# Patient Record
Sex: MALE | Race: WHITE | NOT HISPANIC OR LATINO | Employment: OTHER | URBAN - METROPOLITAN AREA
[De-identification: names, ages, dates, MRNs, and addresses within clinical notes are randomized per-mention and may not be internally consistent; named-entity substitution may affect disease eponyms.]

---

## 2017-03-09 PROBLEM — K05.30 ADULT PERIODONTITIS: Chronic | Status: ACTIVE | Noted: 2017-03-09

## 2017-03-16 ENCOUNTER — HOSPITAL ENCOUNTER (OUTPATIENT)
Dept: RADIOLOGY | Facility: HOSPITAL | Age: 42
Setting detail: OUTPATIENT SURGERY
Discharge: HOME | End: 2017-03-16
Payer: MEDICAID

## 2017-03-16 ENCOUNTER — ANESTHESIA EVENT (OUTPATIENT)
Dept: PERIOP | Facility: HOSPITAL | Age: 42
End: 2017-03-16
Payer: MEDICAID

## 2017-03-16 ENCOUNTER — HOSPITAL ENCOUNTER (OUTPATIENT)
Facility: HOSPITAL | Age: 42
Setting detail: OUTPATIENT SURGERY
Discharge: NURSING, REHAB OR LONG-TERM CARE | End: 2017-03-16
Attending: DENTIST | Admitting: DENTIST
Payer: MEDICAID

## 2017-03-16 ENCOUNTER — ANESTHESIA (OUTPATIENT)
Dept: PERIOP | Facility: HOSPITAL | Age: 42
End: 2017-03-16
Payer: MEDICAID

## 2017-03-16 VITALS
BODY MASS INDEX: 18.03 KG/M2 | TEMPERATURE: 98.4 F | HEART RATE: 63 BPM | SYSTOLIC BLOOD PRESSURE: 119 MMHG | WEIGHT: 98 LBS | RESPIRATION RATE: 18 BRPM | DIASTOLIC BLOOD PRESSURE: 76 MMHG | HEIGHT: 62 IN | OXYGEN SATURATION: 100 %

## 2017-03-16 PROCEDURE — 93005 ELECTROCARDIOGRAM TRACING: CPT | Performed by: DENTIST

## 2017-03-16 RX ORDER — MAGNESIUM HYDROXIDE 1200 MG/15ML
LIQUID ORAL AS NEEDED
Status: DISCONTINUED | OUTPATIENT
Start: 2017-03-16 | End: 2017-03-16 | Stop reason: HOSPADM

## 2017-03-16 RX ORDER — PROPOFOL 10 MG/ML
INJECTION, EMULSION INTRAVENOUS AS NEEDED
Status: DISCONTINUED | OUTPATIENT
Start: 2017-03-16 | End: 2017-03-16 | Stop reason: SURG

## 2017-03-16 RX ORDER — CLINDAMYCIN PHOSPHATE 600 MG/50ML
600 INJECTION INTRAVENOUS
Status: DISCONTINUED | OUTPATIENT
Start: 2017-03-16 | End: 2017-03-16 | Stop reason: HOSPADM

## 2017-03-16 RX ORDER — KETAMINE HYDROCHLORIDE 50 MG/ML
INJECTION, SOLUTION, CONCENTRATE INTRAMUSCULAR; INTRAVENOUS AS NEEDED
Status: DISCONTINUED | OUTPATIENT
Start: 2017-03-16 | End: 2017-03-16 | Stop reason: SURG

## 2017-03-16 RX ORDER — BUPIVACAINE HYDROCHLORIDE AND EPINEPHRINE 5; 5 MG/ML; UG/ML
INJECTION, SOLUTION PERINEURAL AS NEEDED
Status: DISCONTINUED | OUTPATIENT
Start: 2017-03-16 | End: 2017-03-16 | Stop reason: HOSPADM

## 2017-03-16 RX ORDER — MIDAZOLAM HYDROCHLORIDE 1 MG/ML
INJECTION INTRAMUSCULAR; INTRAVENOUS AS NEEDED
Status: DISCONTINUED | OUTPATIENT
Start: 2017-03-16 | End: 2017-03-16 | Stop reason: SURG

## 2017-03-16 RX ORDER — CLINDAMYCIN PHOSPHATE 150 MG/ML
INJECTION, SOLUTION INTRAVENOUS AS NEEDED
Status: DISCONTINUED | OUTPATIENT
Start: 2017-03-16 | End: 2017-03-16 | Stop reason: SURG

## 2017-03-16 RX ORDER — CHLORHEXIDINE GLUCONATE 0.12 MG/ML
RINSE ORAL AS NEEDED
Status: DISCONTINUED | OUTPATIENT
Start: 2017-03-16 | End: 2017-03-16 | Stop reason: HOSPADM

## 2017-03-16 RX ORDER — ROCURONIUM BROMIDE 10 MG/ML
INJECTION, SOLUTION INTRAVENOUS AS NEEDED
Status: DISCONTINUED | OUTPATIENT
Start: 2017-03-16 | End: 2017-03-16 | Stop reason: SURG

## 2017-03-16 RX ORDER — GLYCOPYRROLATE 0.2 MG/ML
INJECTION INTRAMUSCULAR; INTRAVENOUS AS NEEDED
Status: DISCONTINUED | OUTPATIENT
Start: 2017-03-16 | End: 2017-03-16 | Stop reason: SURG

## 2017-03-16 RX ADMIN — PROPOFOL 150 MG: 10 INJECTION, EMULSION INTRAVENOUS at 10:12

## 2017-03-16 RX ADMIN — NEOSTIGMINE METHYLSULFATE 3 MG: 1 INJECTION INTRAMUSCULAR; INTRAVENOUS; SUBCUTANEOUS at 11:47

## 2017-03-16 RX ADMIN — MIDAZOLAM HYDROCHLORIDE 2 MG: 1 INJECTION, SOLUTION INTRAMUSCULAR; INTRAVENOUS at 09:50

## 2017-03-16 RX ADMIN — ROCURONIUM BROMIDE 35 MG: 10 INJECTION, SOLUTION INTRAVENOUS at 10:12

## 2017-03-16 RX ADMIN — CLINDAMYCIN PHOSPHATE 600 MG: 150 INJECTION, SOLUTION INTRAMUSCULAR; INTRAVENOUS at 10:12

## 2017-03-16 RX ADMIN — GLYCOPYRROLATE 0.2 MG: 0.2 INJECTION, SOLUTION INTRAMUSCULAR; INTRAVENOUS at 11:47

## 2017-03-16 RX ADMIN — KETAMINE HYDROCHLORIDE 100 MG: 50 INJECTION INTRAMUSCULAR; INTRAVENOUS at 09:50

## 2017-03-20 LAB
ATRIAL RATE: 73 BPM
P AXIS: 76 DEGREES
PR INTERVAL: 138 MS
QRS AXIS: 86 DEGREES
QRSD INTERVAL: 86 MS
QT INTERVAL: 418 MS
QTC INTERVAL: 460 MS
T WAVE AXIS: 59 DEGREES
VENTRICULAR RATE: 73 BPM

## 2018-08-28 NOTE — PRE-PROCEDURE INSTRUCTIONS
My Surgical Experience    The following information was developed to assist you to prepare for your operation  What do I need to do before coming to the hospital?   Arrange for a responsible person to drive you to and from the hospital    Arrange care for your children at home  Children are not allowed in the recovery areas of the hospital   Plan to wear clothing that is easy to put on and take off  If you are having shoulder surgery, wear a shirt that buttons or zippers in the front  Bathing  o Shower the evening before and the morning of your surgery with an antibacterial soap  Please refer to the Pre Op Showering Instructions for Surgery Patients Sheet   o Remove nail polish and all body piercing jewelry  o Do not shave any body part for at least 24 hours before surgery-this includes face, arms, legs and upper body  Food  o Nothing to eat or drink after midnight the night before your surgery  This includes candy and chewing gum  o Exception: If your surgery is after 12:00pm (noon), you may have clear liquids such as 7-Up®, ginger ale, apple or cranberry juice, Jell-O®, water, or clear broth until 8:00 am  o Do not drink milk or juice with pulp on the morning before surgery  o Do not drink alcohol 24 hours before surgery  Medicine  o Follow instructions you received from your surgeon about which medicines you may take on the day of surgery  o If instructed to take medicine on the morning of surgery, take pills with just a small sip of water  Call your prescribing doctor for specific infroamtion on what to do if you take insulin    What should I bring to the hospital?    Bring:  Sen Sabins or a walker, if you have them, for foot or knee surgery   A list of the daily medicines, vitamins, minerals, herbals and nutritional supplements you take   Include the dosages of medicines and the time you take them each day   Glasses, dentures or hearing aids   Minimal clothing; you will be wearing hospital sleepwear   Photo ID; required to verify your identity   If you have a Living Will or Power of , bring a copy of the documents   If you have an ostomy, bring an extra pouch and any supplies you use    Do not bring   Medicines or inhalers   Money, valuables or jewelry    What other information should I know about the day of surgery?  Notify your surgeons if you develop a cold, sore throat, cough, fever, rash or any other illness   Report to the Ambulatory Surgical/Same Day Surgery Unit   You will be instructed to stop at Registration only if you have not been pre-registered   Inform your  fi they do not stay that they will be asked by the staff to leave a phone number where they can be reached   Be available to be reached before surgery  In the event the operating room schedule changes, you may be asked to come in earlier or later than expected    *It is important to tell your doctor and others involved in your health care if you are taking or have been taking any non-prescription drugs, vitamins, minerals, herbals or other nutritional supplements  Any of these may interact with some food or medicines and cause a reaction      Pre-Surgery Instructions:   Medication Instructions    loratadine (CLARITIN) 10 mg tablet Instructed patient per Anesthesia Guidelines   Multiple Vitamins-Minerals (MULTIVITAMIN) tablet Instructed patient per Anesthesia Guidelines   QUEtiapine (SEROquel) 50 mg tablet Instructed patient per Anesthesia Guidelines  To   Take seroquel a m   Of surgery

## 2018-08-29 ENCOUNTER — ANESTHESIA EVENT (OUTPATIENT)
Dept: PERIOP | Facility: HOSPITAL | Age: 43
End: 2018-08-29
Payer: MEDICAID

## 2018-08-30 ENCOUNTER — ANESTHESIA (OUTPATIENT)
Dept: PERIOP | Facility: HOSPITAL | Age: 43
End: 2018-08-30
Payer: MEDICAID

## 2018-08-30 ENCOUNTER — HOSPITAL ENCOUNTER (OUTPATIENT)
Dept: RADIOLOGY | Facility: HOSPITAL | Age: 43
Setting detail: OUTPATIENT SURGERY
Discharge: HOME | End: 2018-08-30
Payer: MEDICAID

## 2018-08-30 ENCOUNTER — HOSPITAL ENCOUNTER (OUTPATIENT)
Facility: HOSPITAL | Age: 43
Setting detail: OUTPATIENT SURGERY
Discharge: HOME HEALTH | End: 2018-08-30
Attending: DENTIST | Admitting: DENTIST
Payer: MEDICAID

## 2018-08-30 VITALS
HEIGHT: 62 IN | OXYGEN SATURATION: 100 % | HEART RATE: 70 BPM | TEMPERATURE: 98 F | DIASTOLIC BLOOD PRESSURE: 86 MMHG | WEIGHT: 97 LBS | SYSTOLIC BLOOD PRESSURE: 122 MMHG | RESPIRATION RATE: 18 BRPM | BODY MASS INDEX: 17.85 KG/M2

## 2018-08-30 PROBLEM — K05.30 ADULT PERIODONTITIS: Chronic | Status: RESOLVED | Noted: 2017-03-09 | Resolved: 2018-08-30

## 2018-08-30 LAB
ATRIAL RATE: 76 BPM
ATRIAL RATE: 83 BPM
P AXIS: 83 DEGREES
P AXIS: 85 DEGREES
PR INTERVAL: 116 MS
PR INTERVAL: 122 MS
QRS AXIS: 95 DEGREES
QRS AXIS: 96 DEGREES
QRSD INTERVAL: 86 MS
QRSD INTERVAL: 92 MS
QT INTERVAL: 386 MS
QT INTERVAL: 394 MS
QTC INTERVAL: 443 MS
QTC INTERVAL: 453 MS
T WAVE AXIS: 70 DEGREES
T WAVE AXIS: 75 DEGREES
VENTRICULAR RATE: 76 BPM
VENTRICULAR RATE: 83 BPM

## 2018-08-30 PROCEDURE — 93005 ELECTROCARDIOGRAM TRACING: CPT

## 2018-08-30 PROCEDURE — 93010 ELECTROCARDIOGRAM REPORT: CPT | Performed by: INTERNAL MEDICINE

## 2018-08-30 RX ORDER — FENTANYL CITRATE/PF 50 MCG/ML
12.5 SYRINGE (ML) INJECTION
Status: DISCONTINUED | OUTPATIENT
Start: 2018-08-30 | End: 2018-08-30 | Stop reason: HOSPADM

## 2018-08-30 RX ORDER — ONDANSETRON 2 MG/ML
INJECTION INTRAMUSCULAR; INTRAVENOUS AS NEEDED
Status: DISCONTINUED | OUTPATIENT
Start: 2018-08-30 | End: 2018-08-30 | Stop reason: SURG

## 2018-08-30 RX ORDER — FENTANYL CITRATE/PF 50 MCG/ML
25 SYRINGE (ML) INJECTION
Status: DISCONTINUED | OUTPATIENT
Start: 2018-08-30 | End: 2018-08-30 | Stop reason: HOSPADM

## 2018-08-30 RX ORDER — GLYCOPYRROLATE 0.2 MG/ML
INJECTION INTRAMUSCULAR; INTRAVENOUS AS NEEDED
Status: DISCONTINUED | OUTPATIENT
Start: 2018-08-30 | End: 2018-08-30 | Stop reason: SURG

## 2018-08-30 RX ORDER — MAGNESIUM HYDROXIDE 1200 MG/15ML
LIQUID ORAL AS NEEDED
Status: DISCONTINUED | OUTPATIENT
Start: 2018-08-30 | End: 2018-08-30 | Stop reason: HOSPADM

## 2018-08-30 RX ORDER — PROPOFOL 10 MG/ML
INJECTION, EMULSION INTRAVENOUS AS NEEDED
Status: DISCONTINUED | OUTPATIENT
Start: 2018-08-30 | End: 2018-08-30 | Stop reason: SURG

## 2018-08-30 RX ORDER — SODIUM CHLORIDE, SODIUM LACTATE, POTASSIUM CHLORIDE, CALCIUM CHLORIDE 600; 310; 30; 20 MG/100ML; MG/100ML; MG/100ML; MG/100ML
INJECTION, SOLUTION INTRAVENOUS CONTINUOUS PRN
Status: DISCONTINUED | OUTPATIENT
Start: 2018-08-30 | End: 2018-08-30 | Stop reason: SURG

## 2018-08-30 RX ORDER — ONDANSETRON 2 MG/ML
4 INJECTION INTRAMUSCULAR; INTRAVENOUS ONCE AS NEEDED
Status: DISCONTINUED | OUTPATIENT
Start: 2018-08-30 | End: 2018-08-30 | Stop reason: HOSPADM

## 2018-08-30 RX ORDER — CLINDAMYCIN PHOSPHATE 600 MG/50ML
600 INJECTION INTRAVENOUS
Status: DISCONTINUED | OUTPATIENT
Start: 2018-08-30 | End: 2018-08-30 | Stop reason: HOSPADM

## 2018-08-30 RX ORDER — CHLORHEXIDINE GLUCONATE 0.12 MG/ML
RINSE ORAL AS NEEDED
Status: DISCONTINUED | OUTPATIENT
Start: 2018-08-30 | End: 2018-08-30 | Stop reason: HOSPADM

## 2018-08-30 RX ORDER — ROCURONIUM BROMIDE 10 MG/ML
INJECTION, SOLUTION INTRAVENOUS AS NEEDED
Status: DISCONTINUED | OUTPATIENT
Start: 2018-08-30 | End: 2018-08-30 | Stop reason: SURG

## 2018-08-30 RX ORDER — LIDOCAINE HYDROCHLORIDE 10 MG/ML
INJECTION, SOLUTION INFILTRATION; PERINEURAL AS NEEDED
Status: DISCONTINUED | OUTPATIENT
Start: 2018-08-30 | End: 2018-08-30 | Stop reason: SURG

## 2018-08-30 RX ORDER — FENTANYL CITRATE 50 UG/ML
INJECTION, SOLUTION INTRAMUSCULAR; INTRAVENOUS AS NEEDED
Status: DISCONTINUED | OUTPATIENT
Start: 2018-08-30 | End: 2018-08-30 | Stop reason: SURG

## 2018-08-30 RX ORDER — BUPIVACAINE HYDROCHLORIDE AND EPINEPHRINE 5; 5 MG/ML; UG/ML
INJECTION, SOLUTION PERINEURAL AS NEEDED
Status: DISCONTINUED | OUTPATIENT
Start: 2018-08-30 | End: 2018-08-30 | Stop reason: HOSPADM

## 2018-08-30 RX ADMIN — PROPOFOL 150 MG: 10 INJECTION, EMULSION INTRAVENOUS at 13:38

## 2018-08-30 RX ADMIN — PHENYLEPHRINE HYDROCHLORIDE 2 DROP: 1 SPRAY NASAL at 13:39

## 2018-08-30 RX ADMIN — ONDANSETRON 4 MG: 2 INJECTION INTRAMUSCULAR; INTRAVENOUS at 13:47

## 2018-08-30 RX ADMIN — LIDOCAINE HYDROCHLORIDE 50 MG: 10 INJECTION, SOLUTION INFILTRATION; PERINEURAL at 13:38

## 2018-08-30 RX ADMIN — GLYCOPYRROLATE 0.4 MG: 0.2 INJECTION, SOLUTION INTRAMUSCULAR; INTRAVENOUS at 15:17

## 2018-08-30 RX ADMIN — ROCURONIUM BROMIDE 15 MG: 10 INJECTION INTRAVENOUS at 13:57

## 2018-08-30 RX ADMIN — SODIUM CHLORIDE, SODIUM LACTATE, POTASSIUM CHLORIDE, AND CALCIUM CHLORIDE: .6; .31; .03; .02 INJECTION, SOLUTION INTRAVENOUS at 13:33

## 2018-08-30 RX ADMIN — DEXAMETHASONE SODIUM PHOSPHATE 4 MG: 10 INJECTION INTRAMUSCULAR; INTRAVENOUS at 13:47

## 2018-08-30 RX ADMIN — CLINDAMYCIN PHOSPHATE 600 MG: 12 INJECTION, SOLUTION INTRAMUSCULAR; INTRAVENOUS at 13:22

## 2018-08-30 RX ADMIN — FENTANYL CITRATE 50 MCG: 50 INJECTION, SOLUTION INTRAMUSCULAR; INTRAVENOUS at 13:38

## 2018-08-30 RX ADMIN — NEOSTIGMINE METHYLSULFATE 3 MG: 1 INJECTION, SOLUTION INTRAMUSCULAR; INTRAVENOUS; SUBCUTANEOUS at 15:17

## 2018-08-30 RX ADMIN — PROPOFOL 50 MG: 10 INJECTION, EMULSION INTRAVENOUS at 13:34

## 2018-08-30 RX ADMIN — ROCURONIUM BROMIDE 25 MG: 10 INJECTION INTRAVENOUS at 13:38

## 2018-08-30 NOTE — PROGRESS NOTES
Patient seen and examined in same-day surgery center  Patient was clinically evaluated and all of paper chart was reviewed  Patient was not found to have any sort of changes in the last 30 days since they were seen and evaluated by their primary care provider  Patient is still consistent with findings of being medically cleared for procedure

## 2018-08-30 NOTE — OP NOTE
OPERATIVE REPORT  PATIENT NAME: Morgan Hooper    :  1975  MRN: [id number]  Pt Location: [facility] OR ROOM 02    SURGERY DATE: 2018    Surgeon(s) and Role:     * Evan Tabares, DMD - Primary    Preop Diagnosis:  Chronic periodontitis [K05 30]  Intellectual disability [F79]    Post-Op Diagnosis Codes:     * Chronic periodontitis [K05 30]     * Intellectual disability [F79]    Procedure(s) (LRB):  Dental exam, full mouth dental xrays, planing and scaling, gingivectomy, impressions x2, periocharting, dsense, fluoride, composite restorations # 10(MFILD), 8(MFD), 9(MFD) (N/A)    Specimen(s):  * No specimens in log *    Estimated Blood Loss:   Minimal    Drains:       Anesthesia Type:   General    Operative Indications:  Chronic periodontitis [K05 30]  Intellectual disability [F79]  Gingival hyperplasia  Dental caries  Operative Findings:      Complications:   None    Procedure and Technique: Following the induction of IV inhalation anesthesia with nasotracheal intubation, this patient was prepped and draped for an intraoral dental surgical procedure  Fourteen periapical dental radiographs were exposed and stored  Preliminary treatment plan format at that time  Following insertion of a posterior pharyngeal pack with Ray-Bashir gauze single tail moist and debridement of the dentition was completed utilizing ultrasonic scaling with 30 KHZ Cavitron unit  Both large and slim tip inserts were utilized  Handheld curettes and scalers were used where appropriate  Comprehensive evaluation of the patient's oral cavity and teeth was then completed  This information was related to the aforementioned radiographic survey whereby definitive treatment plan was then set forth  Following administration of 7 2 mL Marcaine 0 5% epi concentration 1:200,000 this patient had gingiva ectomy surgery in all 4 quadrants  Upper right, upper left, lower left llower right  The Bovie electric cautery unit was utilized the setting was 25  for both cutting and coagulation [place] tip N -117 was utilized and hemostasis achieved in the coagulative modality  Attention was then directed to carious teeth which were restored using composite resident material shaved a 3 5  Tooth 10  Mesial facial incisal lingual distal surfaces, tooth 8  Mesial facial distal surfaces, tooth 9  Mesial facial distal surfaces  The patient also had adult prophylaxis, desensitizing Medica med and fluoride treatment performed for all remaining teeth  This patient received 600 mg of Cleocin intravenously during the surgery  Both maxillary and mandibular polyvinyl side lock seen impressions were taken for study models  The posterior pharyngeal pack was removed and the oral pharyngeal area was suction irrigated with sterile saline in the usual manner  The patient left the operating room in satisfactory condition and was transported to the PACU without incident     I was present for the entire procedure    Patient Disposition:  PACU     SIGNATURE: Jamar Williamson DMD  DATE: August 30, 2018  TIME: 3:19 PM

## 2018-08-30 NOTE — ANESTHESIA PREPROCEDURE EVALUATION
Review of Systems/Medical History  Patient summary reviewed  Chart reviewed      Cardiovascular   Pulmonary       GI/Hepatic            Endo/Other     GYN       Hematology   Musculoskeletal       Neurology   Psychology   Anxiety, Depression ,     Comment: Intellectual disability          Physical Exam    Airway    Mallampati score: II  TM Distance: >3 FB  Neck ROM: full     Dental   Comment: Poor dentition ,     Cardiovascular  Rhythm: regular, Rate: normal,     Pulmonary  Breath sounds clear to auscultation,     Other Findings        Anesthesia Plan  ASA Score- 3     Anesthesia Type- general with ASA Monitors  Additional Monitors:   Airway Plan: ETT  Plan Factors-    Induction- intravenous  Postoperative Plan- Plan for postoperative opioid use  Planned trial extubation    Informed Consent- Anesthetic plan and risks discussed with legal guardian and healthcare power of   I personally reviewed this patient with the CRNA  Discussed and agreed on the Anesthesia Plan with the CRNA  Radu Gibbons

## 2023-06-12 RX ORDER — FLUOXETINE HYDROCHLORIDE 20 MG/1
20 CAPSULE ORAL DAILY
Status: ON HOLD | COMMUNITY
End: 2023-06-16 | Stop reason: ALTCHOICE

## 2023-06-12 RX ORDER — KETOCONAZOLE 20 MG/ML
SHAMPOO TOPICAL 2 TIMES WEEKLY
COMMUNITY

## 2023-06-12 RX ORDER — MELATONIN
200 DAILY
COMMUNITY

## 2023-06-12 RX ORDER — FLUOXETINE HYDROCHLORIDE 40 MG/1
40 CAPSULE ORAL DAILY
COMMUNITY

## 2023-06-12 NOTE — PRE-PROCEDURE INSTRUCTIONS
My Surgical Experience    The following information was developed to assist you to prepare for your operation  What do I need to do before coming to the hospital?  • Arrange for a responsible person to drive you to and from the hospital   • Arrange care for your children at home  Children are not allowed in the recovery areas of the hospital  • Plan to wear clothing that is easy to put on and take off  If you are having shoulder surgery, wear a shirt that buttons or zippers in the front  Bathing  o Shower the evening before and the morning of your surgery with an antibacterial soap  Please refer to the Pre Op Showering Instructions for Surgery Patients Sheet   o Remove nail polish and all body piercing jewelry  o Do not shave any body part for at least 24 hours before surgery-this includes face, arms, legs and upper body  Food  o Nothing to eat or drink after midnight the night before your surgery  This includes candy and chewing gum  o Exception: If your surgery is after 12:00pm (noon), you may have clear liquids such as 7-Up®, ginger ale, apple or cranberry juice, Jell-O®, water, or clear broth until 8:00 am  o Do not drink milk or juice with pulp on the morning before surgery  o Do not drink alcohol 24 hours before surgery  Medicine  o Follow instructions you received from your surgeon about which medicines you may take on the day of surgery  o If instructed to take medicine on the morning of surgery, take pills with just a small sip of water  Call your prescribing doctor for specific infroamtion on what to do if you take insulin    What should I bring to the hospital?    Bring:  • Crutches or a walker, if you have them, for foot or knee surgery  • A list of the daily medicines, vitamins, minerals, herbals and nutritional supplements you take   Include the dosages of medicines and the time you take them each day  • Glasses, dentures or hearing aids  • Minimal clothing; you will be wearing hospital sleepwear  • Photo ID; required to verify your identity  • If you have a Living Will or Power of , bring a copy of the documents  • If you have an ostomy, bring an extra pouch and any supplies you use    Do not bring  • Medicines or inhalers  • Money, valuables or jewelry    What other information should I know about the day of surgery? • Notify your surgeons if you develop a cold, sore throat, cough, fever, rash or any other illness  • Report to the Ambulatory Surgical/Same Day Surgery Unit  • You will be instructed to stop at Registration only if you have not been pre-registered  • Inform your  fi they do not stay that they will be asked by the staff to leave a phone number where they can be reached  • Be available to be reached before surgery  In the event the operating room schedule changes, you may be asked to come in earlier or later than expected    *It is important to tell your doctor and others involved in your health care if you are taking or have been taking any non-prescription drugs, vitamins, minerals, herbals or other nutritional supplements  Any of these may interact with some food or medicines and cause a reaction      Pre-Surgery Instructions:   Medication Instructions   • cholecalciferol (VITAMIN D3) 1,000 units tablet Hold day of surgery  • FLUoxetine (PROzac) 40 MG capsule Take day of surgery  • ketoconazole (NIZORAL) 2 % shampoo Hold day of surgery  • loratadine (CLARITIN) 10 mg tablet Hold day of surgery  • Multiple Vitamins-Minerals (MULTIVITAMIN) tablet Hold day of surgery  • QUEtiapine (SEROquel) 50 mg tablet Take day of surgery

## 2023-06-14 ENCOUNTER — ANESTHESIA EVENT (OUTPATIENT)
Dept: PERIOP | Facility: HOSPITAL | Age: 48
End: 2023-06-14
Payer: MEDICAID

## 2023-06-16 ENCOUNTER — ANESTHESIA (OUTPATIENT)
Dept: PERIOP | Facility: HOSPITAL | Age: 48
End: 2023-06-16
Payer: MEDICAID

## 2023-06-16 ENCOUNTER — HOSPITAL ENCOUNTER (OUTPATIENT)
Facility: HOSPITAL | Age: 48
Setting detail: OUTPATIENT SURGERY
Discharge: ANOTHER HOSPITAL | End: 2023-06-16
Attending: DENTIST | Admitting: DENTIST
Payer: MEDICAID

## 2023-06-16 ENCOUNTER — APPOINTMENT (OUTPATIENT)
Dept: RADIOLOGY | Facility: HOSPITAL | Age: 48
End: 2023-06-16
Payer: MEDICAID

## 2023-06-16 VITALS
OXYGEN SATURATION: 98 % | DIASTOLIC BLOOD PRESSURE: 78 MMHG | WEIGHT: 109 LBS | HEART RATE: 72 BPM | SYSTOLIC BLOOD PRESSURE: 119 MMHG | BODY MASS INDEX: 19.94 KG/M2 | TEMPERATURE: 97.3 F | RESPIRATION RATE: 16 BRPM

## 2023-06-16 PROBLEM — F41.9 ANXIETY: Status: ACTIVE | Noted: 2023-06-16

## 2023-06-16 RX ORDER — BUPIVACAINE HYDROCHLORIDE AND EPINEPHRINE 5; 5 MG/ML; UG/ML
INJECTION, SOLUTION PERINEURAL AS NEEDED
Status: DISCONTINUED | OUTPATIENT
Start: 2023-06-16 | End: 2023-06-16 | Stop reason: HOSPADM

## 2023-06-16 RX ORDER — PHENYLEPHRINE HCL IN 0.9% NACL 1 MG/10 ML
SYRINGE (ML) INTRAVENOUS AS NEEDED
Status: DISCONTINUED | OUTPATIENT
Start: 2023-06-16 | End: 2023-06-16

## 2023-06-16 RX ORDER — CLINDAMYCIN PHOSPHATE 600 MG/50ML
600 INJECTION INTRAVENOUS
Status: DISCONTINUED | OUTPATIENT
Start: 2023-06-16 | End: 2023-06-16 | Stop reason: HOSPADM

## 2023-06-16 RX ORDER — ONDANSETRON 2 MG/ML
INJECTION INTRAMUSCULAR; INTRAVENOUS AS NEEDED
Status: DISCONTINUED | OUTPATIENT
Start: 2023-06-16 | End: 2023-06-16

## 2023-06-16 RX ORDER — MIDAZOLAM HYDROCHLORIDE 2 MG/2ML
INJECTION, SOLUTION INTRAMUSCULAR; INTRAVENOUS AS NEEDED
Status: DISCONTINUED | OUTPATIENT
Start: 2023-06-16 | End: 2023-06-16

## 2023-06-16 RX ORDER — MAGNESIUM HYDROXIDE 1200 MG/15ML
LIQUID ORAL AS NEEDED
Status: DISCONTINUED | OUTPATIENT
Start: 2023-06-16 | End: 2023-06-16 | Stop reason: HOSPADM

## 2023-06-16 RX ORDER — ONDANSETRON 2 MG/ML
4 INJECTION INTRAMUSCULAR; INTRAVENOUS ONCE AS NEEDED
Status: DISCONTINUED | OUTPATIENT
Start: 2023-06-16 | End: 2023-06-16 | Stop reason: HOSPADM

## 2023-06-16 RX ORDER — PROPOFOL 10 MG/ML
INJECTION, EMULSION INTRAVENOUS AS NEEDED
Status: DISCONTINUED | OUTPATIENT
Start: 2023-06-16 | End: 2023-06-16

## 2023-06-16 RX ORDER — SODIUM CHLORIDE, SODIUM LACTATE, POTASSIUM CHLORIDE, CALCIUM CHLORIDE 600; 310; 30; 20 MG/100ML; MG/100ML; MG/100ML; MG/100ML
125 INJECTION, SOLUTION INTRAVENOUS CONTINUOUS
Status: DISCONTINUED | OUTPATIENT
Start: 2023-06-16 | End: 2023-06-16 | Stop reason: HOSPADM

## 2023-06-16 RX ORDER — KETAMINE HYDROCHLORIDE 100 MG/ML
INJECTION INTRAMUSCULAR; INTRAVENOUS AS NEEDED
Status: DISCONTINUED | OUTPATIENT
Start: 2023-06-16 | End: 2023-06-16

## 2023-06-16 RX ORDER — SODIUM CHLORIDE, SODIUM LACTATE, POTASSIUM CHLORIDE, CALCIUM CHLORIDE 600; 310; 30; 20 MG/100ML; MG/100ML; MG/100ML; MG/100ML
INJECTION, SOLUTION INTRAVENOUS CONTINUOUS PRN
Status: DISCONTINUED | OUTPATIENT
Start: 2023-06-16 | End: 2023-06-16

## 2023-06-16 RX ORDER — ROCURONIUM BROMIDE 10 MG/ML
INJECTION, SOLUTION INTRAVENOUS AS NEEDED
Status: DISCONTINUED | OUTPATIENT
Start: 2023-06-16 | End: 2023-06-16

## 2023-06-16 RX ORDER — CHLORHEXIDINE GLUCONATE 0.12 MG/ML
RINSE ORAL AS NEEDED
Status: DISCONTINUED | OUTPATIENT
Start: 2023-06-16 | End: 2023-06-16 | Stop reason: HOSPADM

## 2023-06-16 RX ORDER — DEXAMETHASONE SODIUM PHOSPHATE 10 MG/ML
INJECTION, SOLUTION INTRAMUSCULAR; INTRAVENOUS AS NEEDED
Status: DISCONTINUED | OUTPATIENT
Start: 2023-06-16 | End: 2023-06-16

## 2023-06-16 RX ORDER — LIDOCAINE HYDROCHLORIDE 10 MG/ML
INJECTION, SOLUTION EPIDURAL; INFILTRATION; INTRACAUDAL; PERINEURAL AS NEEDED
Status: DISCONTINUED | OUTPATIENT
Start: 2023-06-16 | End: 2023-06-16

## 2023-06-16 RX ADMIN — ROCURONIUM BROMIDE 50 MG: 10 INJECTION, SOLUTION INTRAVENOUS at 10:06

## 2023-06-16 RX ADMIN — ONDANSETRON 4 MG: 2 INJECTION INTRAMUSCULAR; INTRAVENOUS at 10:11

## 2023-06-16 RX ADMIN — LIDOCAINE HYDROCHLORIDE 50 MG: 10 INJECTION, SOLUTION EPIDURAL; INFILTRATION; INTRACAUDAL; PERINEURAL at 10:06

## 2023-06-16 RX ADMIN — PHENYLEPHRINE HYDROCHLORIDE 2 DROP: 1 SPRAY NASAL at 10:07

## 2023-06-16 RX ADMIN — PROPOFOL 100 MG: 10 INJECTION, EMULSION INTRAVENOUS at 10:06

## 2023-06-16 RX ADMIN — KETAMINE HYDROCHLORIDE 100 MG: 100 INJECTION INTRAMUSCULAR; INTRAVENOUS at 10:01

## 2023-06-16 RX ADMIN — SUGAMMADEX 200 MG: 100 INJECTION, SOLUTION INTRAVENOUS at 11:08

## 2023-06-16 RX ADMIN — CLINDAMYCIN PHOSPHATE 600 MG: 600 INJECTION, SOLUTION INTRAVENOUS at 10:09

## 2023-06-16 RX ADMIN — Medication 100 MCG: at 10:25

## 2023-06-16 RX ADMIN — DEXAMETHASONE SODIUM PHOSPHATE 5 MG: 10 INJECTION, SOLUTION INTRAMUSCULAR; INTRAVENOUS at 10:11

## 2023-06-16 RX ADMIN — MIDAZOLAM 2 MG: 1 INJECTION INTRAMUSCULAR; INTRAVENOUS at 10:01

## 2023-06-16 RX ADMIN — SODIUM CHLORIDE, SODIUM LACTATE, POTASSIUM CHLORIDE, AND CALCIUM CHLORIDE: .6; .31; .03; .02 INJECTION, SOLUTION INTRAVENOUS at 10:06

## 2023-06-16 NOTE — PERIOPERATIVE NURSING NOTE
Patient received from PACU, Awake and Alert  tolerating Po fluids  IV access removed  Aide at the bedside

## 2023-06-16 NOTE — OP NOTE
COMPLETE ORAL REHABILITATION, DENTAL EXAM, FULL MOUTH DENTAL XRAYS, PLANING AND SCALING, GINGIVECTOMY, PERIO CHARTING, COMPOSITE RESTORATION  #31 MOBL, EXTRACTIONS #8, #9, PROPHYLAXIS, D/SENSE, FLUORIDE  Postoperative Note  PATIENT NAME: Jeremiah Pena  : 1975  MRN: [id number]  [facility]OM 02    Surgery Date: 2023    Unspecified intellectual disabilities [F79]  Chronic periodontitis, unspecified [K05 30]    Post-Op Diagnosis Codes:     * Unspecified intellectual disabilities [F79]     * Chronic periodontitis, unspecified [K05 30]    Procedure(s):  COMPLETE ORAL REHABILITATION, DENTAL EXAM, FULL MOUTH DENTAL XRAYS, PLANING AND SCALING, GINGIVECTOMY, PERIO CHARTING, COMPOSITE RESTORATION  #31 MOBL, EXTRACTIONS #8, #9, PROPHYLAXIS, D/SENSE, FLUORIDE    Surgeon(s) and Role:     * Farida Schmitz DMD - Primary    Specimens:  none    Estimated Blood Loss:   Minimal    Anesthesia Type:   General     Operative report:            Complications:   None    SIGNATURE: Farida Schmitz DMD   DATE: 2023   TIME: 11:17 AM COMPLETE ORAL REHABILITATION, DENTAL EXAM, FULL MOUTH DENTAL XRAYS, PLANING AND SCALING, GINGIVECTOMY, PERIO CHARTING, COMPOSITE RESTORATION  #31 MOBL, EXTRACTIONS #8, #9, PROPHYLAXIS, D/SENSE, FLUORIDE  Postoperative Note  PATIENT NAME: Jeremiah Pena  : 1975  MRN: [id number]  [facility] 02    Surgery Date: 2023    Unspecified intellectual disabilities [F79]  Chronic periodontitis, unspecified [K05 30]    Post-Op Diagnosis Codes:     * Unspecified intellectual disabilities [F79]     * Chronic periodontitis, unspecified [K05 30]    Procedure(s):  COMPLETE ORAL REHABILITATION, DENTAL EXAM, FULL MOUTH DENTAL XRAYS, PLANING AND SCALING, GINGIVECTOMY, PERIO CHARTING, COMPOSITE RESTORATION  #31 MOBL, EXTRACTIONS #8, #9, PROPHYLAXIS, D/SENSE, FLUORIDE    Surgeon(s) and Role:     * Farida Schmitz DMD - Primary    Specimens:  none    Estimated Blood Loss:   Minimal    Anesthesia Type:   General     Operative report:            Complications:   None    SIGNATURE: Aditya Dotson DMD   DATE: 2023   TIME: 11:17 AMOPERATIVE REPORT  PATIENT NAME: Ya Schwartz    :  1975  MRN: [id number]  Pt Location: [facility] ROOM 02    SURGERY DATE: 2023    Surgeon(s) and Role:     * Aditya Dotson DMD - Primary    Preop Diagnosis:  Unspecified intellectual disabilities [F79]  Chronic periodontitis, unspecified [K05 30]    Post-Op Diagnosis Codes:     * Unspecified intellectual disabilities [F79]     * Chronic periodontitis, unspecified [Z52 52]    Procedure(s):  COMPLETE ORAL REHABILITATION  DENTAL EXAM  FULL MOUTH DENTAL XRAYS  PLANING AND SCALING  GINGIVECTOMY  PERIO CHARTING  COMPOSITE RESTORATION  #31 MOBL  EXTRACTIONS #8  #9  PROPHYLAXIS  D/SENSE  FLUORIDE    Specimen(s):  * No specimens in log *    Estimated Blood Loss:   Minimal    Drains:  * No LDAs found *    Anesthesia Type:   General    Operative Indications:  Unspecified intellectual disabilities [F79]  Chronic periodontitis, unspecified [K05 30]  Chronic adult periodontitis, dental caries, severe gingival hyperplasia, multiple abscessed teeth  Operative Findings:      Complications:   None    Procedure and Technique: Following the induction of IV inhalation anesthesia with nasotracheal intubation, this patient was prepped and draped for an intraoral dental surgical procedure  14 periapical dental radiographs were exposed and stored  Preliminary treatment plan formulated at that time  Following insertion of a posterior pharyngeal pack with Ray-Bashir gauze single tail moistened debridement of the dentition was completed utilizing ultrasonic scaling with the [address] Cavitron unit  Both large and slim tip inserts were utilized  Hand-held curettes and 's use were appropriate  Comprehensive evaluation of the patient's oral cavity and teeth was then completed    This information was related to the aforementioned radiographic survey whereby definitive treatment plan was then set forth following administration of 9 0 mL of Marcaine 0 5% epi concentration 1-200,000 patient had gingivectomy surgery in all 4 quadrants  Upper right, upper left, lower left, and lower right  The Bovie electrocautery unit was utilized the setting was #30 for both cutting and coagulation  [place] tip-117 was utilized and hemostasis achieved in a coagulative modality  Attention was then directed to carious tooth #31  This tooth was restored using composite resin material Shade a 3 5  The mesial occlusal buccal and lingual surfaces were restored  Attention was then directed to nonrestorable and abscessed teeth numbers 8 and #9  These teeth were extracted using standard elevators and forceps interseptal bone was removed with a rongeur and bone file Gelfoam and eight 3-0 Vicryl sutures used for closure  This patient also had adult prophylaxis, desensitizing medicaments and fluoride treatment performed for all remaining dentition  This patient received 600 mg of Cleocin intravenously during the surgery  The posterior pharyngeal pack was removed and the oropharyngeal area suction irrigated with sterile saline in the usual manner  This patient left the operating room in satisfactory condition and was transported to the PACU without incident  I was present for the entire procedure      Patient Disposition:  PACU         SIGNATURE: Nan Vargas DMD  DATE: June 16, 2023  TIME: 11:17 AM

## 2023-06-16 NOTE — ANESTHESIA PREPROCEDURE EVALUATION
Procedure:  COMPLETE ORAL REHABILITATION (Mouth)    Relevant Problems   NEURO/PSYCH   (+) Anxiety      PHENYLKETONURIA  Physical Exam    Airway    Mallampati score: II  TM Distance: >3 FB  Neck ROM: full     Dental   No notable dental hx     Cardiovascular  Cardiovascular exam normal    Pulmonary  Pulmonary exam normal     Other Findings      autism  Anesthesia Plan  ASA Score- 3     Anesthesia Type- general with ASA Monitors  Additional Monitors:   Airway Plan: NTT  Plan Factors-    Chart reviewed  Existing labs reviewed  Patient summary reviewed  Patient is not a current smoker  Induction- intravenous  Postoperative Plan- Plan for postoperative opioid use  Informed Consent- Anesthetic plan and risks discussed with legal guardian  I personally reviewed this patient with the CRNA  Discussed and agreed on the Anesthesia Plan with the CRNA  Mikala Fischer

## 2023-06-16 NOTE — ANESTHESIA POSTPROCEDURE EVALUATION
Post-Op Assessment Note    CV Status:  Stable  Pain Score: 0    Pain management: adequate     Mental Status:  Sleepy   PONV Controlled:  None   Airway Patency:  Patent      Post Op Vitals Reviewed: Yes      Staff: Anesthesiologist, CRNA         No notable events documented      BP   112/67   Temp     Pulse  70   Resp   14   SpO2   98

## 2023-06-16 NOTE — QUICK NOTE
Pt seen and examined bedside  Pt and staff deny and cough, congestion, runny nose, shortness of breath, difficulty breathing, chest pain  Staff reports no issues ambulating 2 block or going up 2 flights of steps  Pt's H&P, labs and ECG reviewed  No changes to H&P  Pt cleared from a surgical aspect  /76   Pulse 80   Temp (!) 95 5 °F (35 3 °C)   Resp 18   Wt 49 4 kg (109 lb)   SpO2 98%   BMI 19 94 kg/m²       GA: alert  Heart: RRR  Lungs:CTABL   Abdomen: soft, non-tender  Extremities: well perfused, no cyanosis

## 2024-03-05 ENCOUNTER — ANESTHESIA EVENT (OUTPATIENT)
Dept: PERIOP | Facility: HOSPITAL | Age: 49
End: 2024-03-05
Payer: MEDICAID

## 2024-03-05 NOTE — PRE-PROCEDURE INSTRUCTIONS
Pre-Surgery Instructions:   Medication Instructions    Cholecalciferol 50 MCG (2000 UT) CAPS Hold day of surgery.    FLUoxetine (PROzac) 40 MG capsule Take day of surgery.    ketoconazole (NIZORAL) 2 % shampoo Hold day of surgery.    loratadine (CLARITIN) 10 mg tablet Hold day of surgery.    Multiple Vitamins-Minerals (MULTIVITAMIN) tablet Hold day of surgery.    QUEtiapine (SEROquel) 50 mg tablet Take day of surgery.    Phase2/3 faxed to facility. Medication instructions for day surgery reviewed. Please use only a sip of water to take your instructed medications. Avoid all over the counter vitamins, supplements and NSAIDS for one week prior to surgery per anesthesia guidelines. Tylenol is ok to take as needed.     You will receive a call one business day prior to surgery with an arrival time and hospital directions. If your surgery is scheduled on a Monday, the hospital will be calling you on the Friday prior to your surgery. If you have not heard from anyone by 8pm, please call the hospital supervisor through the hospital  at [phone]. ([place] [phone] or [place] [phone]).    Do not eat or drink anything after midnight the night before your surgery, including candy, mints, lifesavers, or chewing gum. Do not drink alcohol 24hrs before your surgery. Try not to smoke at least 24hrs before your surgery.       Follow the pre surgery showering instructions as listed in the “My Surgical Experience Booklet” or otherwise provided by your surgeon's office. Do not use a blade to shave the surgical area 1 week before surgery. It is okay to use a clean electric clippers up to 24 hours before surgery. Do not apply any lotions, creams, including makeup, cologne, deodorant, or perfumes after showering on the day of your surgery. Do not use dry shampoo, hair spray, hair gel, or any type of hair products.     No contact lenses, eye make-up, or artificial eyelashes. Remove nail polish, including gel polish,  and any artificial, gel, or acrylic nails if possible. Remove all jewelry including rings and body piercing jewelry.     Wear causal clothing that is easy to take on and off. Consider your type of surgery.    Keep any valuables, jewelry, piercings at home. Please bring any specially ordered equipment (sling, braces) if indicated.    Arrange for a responsible person to drive you to and from the hospital on the day of your surgery. Please confirm the visitor policy for the day of your procedure when you receive your phone call with an arrival time.     Call the surgeon's office with any new illnesses, exposures, or additional questions prior to surgery.    Please reference your “My Surgical Experience Booklet” for additional information to prepare for your upcoming surgery.

## 2024-03-07 ENCOUNTER — ANESTHESIA (OUTPATIENT)
Dept: PERIOP | Facility: HOSPITAL | Age: 49
End: 2024-03-07
Payer: MEDICAID

## 2024-03-07 ENCOUNTER — APPOINTMENT (OUTPATIENT)
Dept: RADIOLOGY | Facility: HOSPITAL | Age: 49
End: 2024-03-07
Payer: MEDICAID

## 2024-03-07 ENCOUNTER — HOSPITAL ENCOUNTER (OUTPATIENT)
Facility: HOSPITAL | Age: 49
Setting detail: OUTPATIENT SURGERY
Discharge: NURSING, REHAB OR LONG-TERM CARE | End: 2024-03-07
Attending: DENTIST | Admitting: DENTIST
Payer: MEDICAID

## 2024-03-07 VITALS
SYSTOLIC BLOOD PRESSURE: 124 MMHG | OXYGEN SATURATION: 99 % | DIASTOLIC BLOOD PRESSURE: 82 MMHG | RESPIRATION RATE: 16 BRPM | WEIGHT: 116 LBS | BODY MASS INDEX: 21.22 KG/M2 | TEMPERATURE: 97.5 F | HEART RATE: 82 BPM

## 2024-03-07 PROBLEM — E70.1 PKU (PHENYLKETONURIA) (HCC): Status: ACTIVE | Noted: 2024-03-07

## 2024-03-07 PROBLEM — F84.0 AUTISM: Status: ACTIVE | Noted: 2024-03-07

## 2024-03-07 PROBLEM — F81.9 INTELLECTUAL DELAY: Status: ACTIVE | Noted: 2024-03-07

## 2024-03-07 RX ORDER — DEXAMETHASONE SODIUM PHOSPHATE 10 MG/ML
INJECTION, SOLUTION INTRAMUSCULAR; INTRAVENOUS AS NEEDED
Status: DISCONTINUED | OUTPATIENT
Start: 2024-03-07 | End: 2024-03-07

## 2024-03-07 RX ORDER — ROCURONIUM BROMIDE 10 MG/ML
INJECTION, SOLUTION INTRAVENOUS AS NEEDED
Status: DISCONTINUED | OUTPATIENT
Start: 2024-03-07 | End: 2024-03-07

## 2024-03-07 RX ORDER — CLINDAMYCIN PHOSPHATE 600 MG/50ML
600 INJECTION, SOLUTION INTRAVENOUS
Status: DISCONTINUED | OUTPATIENT
Start: 2024-03-07 | End: 2024-03-07 | Stop reason: HOSPADM

## 2024-03-07 RX ORDER — MIDAZOLAM HYDROCHLORIDE 2 MG/2ML
INJECTION, SOLUTION INTRAMUSCULAR; INTRAVENOUS AS NEEDED
Status: DISCONTINUED | OUTPATIENT
Start: 2024-03-07 | End: 2024-03-07

## 2024-03-07 RX ORDER — PROPOFOL 10 MG/ML
INJECTION, EMULSION INTRAVENOUS AS NEEDED
Status: DISCONTINUED | OUTPATIENT
Start: 2024-03-07 | End: 2024-03-07

## 2024-03-07 RX ORDER — BUPIVACAINE HYDROCHLORIDE AND EPINEPHRINE 5; 5 MG/ML; UG/ML
INJECTION, SOLUTION PERINEURAL AS NEEDED
Status: DISCONTINUED | OUTPATIENT
Start: 2024-03-07 | End: 2024-03-07 | Stop reason: HOSPADM

## 2024-03-07 RX ORDER — GLYCOPYRROLATE 0.2 MG/ML
INJECTION INTRAMUSCULAR; INTRAVENOUS AS NEEDED
Status: DISCONTINUED | OUTPATIENT
Start: 2024-03-07 | End: 2024-03-07

## 2024-03-07 RX ORDER — KETAMINE HYDROCHLORIDE 100 MG/ML
INJECTION, SOLUTION INTRAMUSCULAR; INTRAVENOUS AS NEEDED
Status: DISCONTINUED | OUTPATIENT
Start: 2024-03-07 | End: 2024-03-07

## 2024-03-07 RX ORDER — LIDOCAINE HYDROCHLORIDE 10 MG/ML
0.5 INJECTION, SOLUTION EPIDURAL; INFILTRATION; INTRACAUDAL; PERINEURAL ONCE AS NEEDED
Status: DISCONTINUED | OUTPATIENT
Start: 2024-03-07 | End: 2024-03-07 | Stop reason: HOSPADM

## 2024-03-07 RX ORDER — SODIUM CHLORIDE, SODIUM LACTATE, POTASSIUM CHLORIDE, CALCIUM CHLORIDE 600; 310; 30; 20 MG/100ML; MG/100ML; MG/100ML; MG/100ML
125 INJECTION, SOLUTION INTRAVENOUS CONTINUOUS
Status: DISCONTINUED | OUTPATIENT
Start: 2024-03-07 | End: 2024-03-07 | Stop reason: HOSPADM

## 2024-03-07 RX ORDER — ONDANSETRON 2 MG/ML
INJECTION INTRAMUSCULAR; INTRAVENOUS AS NEEDED
Status: DISCONTINUED | OUTPATIENT
Start: 2024-03-07 | End: 2024-03-07

## 2024-03-07 RX ADMIN — MIDAZOLAM 2 MG: 1 INJECTION INTRAMUSCULAR; INTRAVENOUS at 12:20

## 2024-03-07 RX ADMIN — SODIUM CHLORIDE, SODIUM LACTATE, POTASSIUM CHLORIDE, AND CALCIUM CHLORIDE: .6; .31; .03; .02 INJECTION, SOLUTION INTRAVENOUS at 12:30

## 2024-03-07 RX ADMIN — ONDANSETRON 4 MG: 2 INJECTION INTRAMUSCULAR; INTRAVENOUS at 12:42

## 2024-03-07 RX ADMIN — PHENYLEPHRINE HYDROCHLORIDE 2 DROP: 1 SPRAY NASAL at 12:31

## 2024-03-07 RX ADMIN — DEXAMETHASONE SODIUM PHOSPHATE 10 MG: 10 INJECTION, SOLUTION INTRAMUSCULAR; INTRAVENOUS at 12:42

## 2024-03-07 RX ADMIN — CLINDAMYCIN PHOSPHATE 600 MG: 600 INJECTION, SOLUTION INTRAVENOUS at 12:33

## 2024-03-07 RX ADMIN — PROPOFOL 100 MG: 10 INJECTION, EMULSION INTRAVENOUS at 12:31

## 2024-03-07 RX ADMIN — GLYCOPYRROLATE 0.2 MG: 0.2 INJECTION, SOLUTION INTRAMUSCULAR; INTRAVENOUS at 12:20

## 2024-03-07 RX ADMIN — ROCURONIUM BROMIDE 30 MG: 10 INJECTION, SOLUTION INTRAVENOUS at 12:31

## 2024-03-07 RX ADMIN — SUGAMMADEX 200 MG: 100 INJECTION, SOLUTION INTRAVENOUS at 13:22

## 2024-03-07 RX ADMIN — KETAMINE HYDROCHLORIDE 80 MG: 100 INJECTION INTRAMUSCULAR; INTRAVENOUS at 12:20

## 2024-03-07 NOTE — OP NOTE
COMPLETE ORAL REHABILITATION: Dental exam, full mouth dental xrays, planing and scaling, gingivectomy, periocharting, prophylaxis, d/sense, fluoride treatment, composite restoration #22MFD, #11MFD, 27MFD  Postoperative Note  PATIENT NAME: Carmelo Valderrama  : 1975  MRN: [id number]  [facility] ROOM 03    Surgery Date: 3/7/2024    Unspecified intellectual disabilities [F79]  Chronic periodontitis, unspecified [K05.30]    Post-Op Diagnosis Codes:     * Unspecified intellectual disabilities [F79]     * Chronic periodontitis, unspecified [K05.30]    Procedure(s):  COMPLETE ORAL REHABILITATION: Dental exam, full mouth dental xrays, planing and scaling, gingivectomy, periocharting, prophylaxis, d/sense, fluoride treatment, composite restoration #22MFD, #11MFD, 27MFD    Surgeons and Role:     * Dionicio Rollins DMD - Primary    Specimens:  none    Estimated Blood Loss:   Minimal    Anesthesia Type:   General     Operative report:            Complications:   None    SIGNATURE: Dionicio Rollins DMD   DATE: 2024   TIME: 1:31 PMOPERATIVE REPORT  PATIENT NAME: Carmelo Valderrama    :  1975  MRN: [id number]  Pt Location: [facility] ROOM 03    SURGERY DATE: 3/7/2024    Surgeons and Role:     * Dionicio Rollins DMD - Primary    Preop Diagnosis:  Unspecified intellectual disabilities [F79]  Chronic periodontitis, unspecified [K05.30]    Post-Op Diagnosis Codes:     * Unspecified intellectual disabilities [F79]     * Chronic periodontitis, unspecified [K05.30]    Procedure(s):  COMPLETE ORAL REHABILITATION: Dental exam. full mouth dental xrays. planing and scaling. gingivectomy. periocharting. prophylaxis. d/sense. fluoride treatment. composite restoration #22MFD. #11MFD. 27MFD    Specimen(s):  * No specimens in log *    Estimated Blood Loss:   Minimal    Drains:  * No LDAs found *    Anesthesia Type:   General    Operative Indications:  Unspecified intellectual disabilities [F79]  Chronic periodontitis, unspecified  [K05.30]  Chronic adult periodontitis, severe gingival hyperplasia, multiple dental caries.  Malocclusion.    Operative Findings:      Complications:   None    Procedure and Technique:  Following the induction of IV inhalation anesthesia with nasotracheal intubation this patient was prepped and draped for an intraoral dental surgical procedure.  14 periapical dental radiographs were exposed and stored.  Preliminary treatment plan for made at that time.  Following insertion of a posterior pharyngeal pack with Ray-Bashir gauze single tail moistened debridement of the dentition was completed utilizing ultrasonic scaling with the 30 KH Z Cavitron unit.  Both large and slim tip inserts were utilized.  Hand-held curettes and 's use were appropriate.  Comprehensive evaluation the patient's oral cavity and teeth was then completed.  This information was related to the aforementioned radiographic survey whereby definitive treatment plan was then set forth.  Following administration of 7.2 mL of Marcaine 0.5% epi concentration 1-200,000 patient had gingivectomy surgery in all 4 quadrants.  Upper right, upper left, lower left, lower right.  The Bovie electrocautery unit was utilized the setting was #25 for both cutting and coagulation.  [place] tip-117 was utilized and hemostasis achieved in a collaborative modality.  Attention was then directed to carious teeth.  These were restored using composite resin material Shade A3.5.  #22 mesial facial and distal surfaces, #11 mesial facial and distal surfaces, #27 mesial facial distal surfaces.  Patient also had adult prophylaxis, desensitizing medicaments fluoride treatment performed for all remaining dentition.  This patient did receive 600 mg of Cleocin intravenously during the surgery.  The posterior pharyngeal pack was removed and the oropharyngeal area suction irrigated with sterile saline in usual manner.  The patient left the operating room in satisfactory condition and  was transported to the PACU without incident.   I was present for the entire procedure.    Patient Disposition:  PACU         SIGNATURE: Dionicio Rollins DMD  DATE: March 7, 2024  TIME: 1:31 PM

## 2024-03-07 NOTE — PERIOPERATIVE NURSING NOTE
Received patient from pacu, aoo vitals stable. Pt denies pain and verbalizes wishes to go home. Caregiver present at bedside and patient ready for dc.

## 2024-03-07 NOTE — PERIOPERATIVE NURSING NOTE
Patient discharged to home. Prescriptions and Discharge instructions were given and reviewed with patient. All questions answered. IV was removed per policy and procedure. Pt tolerated this well without complaint. Occlusive dressing was applied to the site. Patient left the unit with all belongings and a firm understanding of discharge instructions.

## 2024-03-07 NOTE — DISCHARGE INSTRUCTIONS
DISCHARGE INSTRUCTIONS  DENTAL PROCEDURE      1.  Salt water rinses 4x per day for 10 days    2.  Do not use drinking straws for next 72 hrs if you had any dental extractions    3.  Soft diet for 24 hours    4.  May return to previous work and activity in  24-48 hours    5.  Return to office for follow-up on 7-10 days

## 2024-03-07 NOTE — ANESTHESIA POSTPROCEDURE EVALUATION
Post-Op Assessment Note    CV Status:  Stable  Pain Score: 0    Pain management: adequate    Multimodal analgesia used between 6 hours prior to anesthesia start to PACU discharge    Mental Status:  Sleepy   Hydration Status:  Stable   PONV Controlled:  None   Airway Patency:  Patent  Two or more mitigation strategies used for obstructive sleep apnea   Post Op Vitals Reviewed: Yes    No anethesia notable event occurred.    Staff: CRNA               /83   Temp 97.4   Pulse 74   Resp 16   SpO2 98

## 2024-03-07 NOTE — ANESTHESIA PREPROCEDURE EVALUATION
Procedure:  COMPLETE ORAL REHABILITATION (Mouth)    Relevant Problems   NEURO/PSYCH   (+) Anxiety      Digestive   (+) Adult periodontitis (Resolved)      Other   (+) Autism   (+) Intellectual delay   (+) PKU (phenylketonuria) (HCC)        Physical Exam    Airway       Dental       Cardiovascular      Pulmonary      Other Findings  Unable to examine due to lack of patient cooperation.      Anesthesia Plan  ASA Score- 3     Anesthesia Type- general with ASA Monitors.         Additional Monitors:     Airway Plan:            Plan Factors-    Chart reviewed.   Existing labs reviewed. Patient summary reviewed.    Patient is not a current smoker.              Induction-     Postoperative Plan-     Informed Consent-   I personally reviewed this patient with the CRNA. Discussed and agreed on the Anesthesia Plan with the CRNA..             Reviewing prior anesthetic chart, patient required IM ketamine and versed due to combativeness. Plan for same.   Consent obtained as part of surgical consent.

## (undated) DEVICE — ROCKER SWITCH PENCIL HOLSTER: Brand: VALLEYLAB

## (undated) DEVICE — GROUNDING PAD UNIVERSAL SLW

## (undated) DEVICE — ASTOUND STANDARD SURGICAL GOWN, XL: Brand: CONVERTORS

## (undated) DEVICE — DENTAL PACK: Brand: CARDINAL HEALTH

## (undated) DEVICE — DISPOS-A-BRUSH FINE BRISTLE

## (undated) DEVICE — GLOVE INDICATOR PI UNDERGLOVE SZ 8.5 BLUE

## (undated) DEVICE — SPECIMEN SOCK - SHORT: Brand: MEDI-VAC

## (undated) DEVICE — DISPOS-A BRUSH STANDARD

## (undated) DEVICE — SUT VICRYL 3-0 PS-1 18 IN J683G

## (undated) DEVICE — SYRINGE BRIXTON AIRWATER SLEEVE CLEAR

## (undated) DEVICE — 3/8 INCH SMOKE TUBING

## (undated) DEVICE — GLOVE INDICATOR PI UNDERGLOVE SZ 7.5 BLUE

## (undated) DEVICE — PENCIL ELECTROSURG E-Z CLEAN -0035H

## (undated) DEVICE — ACCLEAN PROPHY PASTE COARSE: Brand: HENRY SCHEIN

## (undated) DEVICE — GLOVE SRG BIOGEL 8

## (undated) DEVICE — ELECTRODE NEEDLE MEGAFINE E-Z CLEAN 2IN 45DEG -0119

## (undated) DEVICE — MONOJECT NEEDLES 27 GA SHORT METAL HUB

## (undated) DEVICE — PROPHY ANGLE FIRM WHITE DISP LF

## (undated) DEVICE — Device

## (undated) DEVICE — STANDARD SURGICAL GOWN, L: Brand: CONVERTORS

## (undated) DEVICE — SURGIFOAM 7 X 12 SPONGE ABS

## (undated) DEVICE — GLOVE SRG BIOGEL 7.5

## (undated) DEVICE — BUR DENTAL 36 CARBIDE FG

## (undated) DEVICE — SANI-TIP® DISPOSABLE AIR/WATER SYRINGE TIP. CONTENTS:  STANDARD REGULAR KIT - 250 TIPS AND 250 SANI-SHIELD® SLEEVES.: Brand: SANI-TIP®

## (undated) DEVICE — MICROBRUSH PLUS DISP SERIES FINE

## (undated) DEVICE — MICRODISSECTION NEEDLE: Brand: COLORADO

## (undated) DEVICE — SLEEVE CURE HANDLE COVER MODEL 4551